# Patient Record
Sex: FEMALE | Race: WHITE | Employment: OTHER
[De-identification: names, ages, dates, MRNs, and addresses within clinical notes are randomized per-mention and may not be internally consistent; named-entity substitution may affect disease eponyms.]

---

## 2017-11-07 ENCOUNTER — ESTABLISHED COMPREHENSIVE EXAM (OUTPATIENT)
Age: 57
End: 2017-11-07

## 2017-11-07 DIAGNOSIS — H52.11: ICD-10-CM

## 2017-11-07 DIAGNOSIS — H52.201: ICD-10-CM

## 2017-11-07 PROCEDURE — 92014 COMPRE OPH EXAM EST PT 1/>: CPT

## 2017-11-07 PROCEDURE — 92015 DETERMINE REFRACTIVE STATE: CPT

## 2017-11-07 ASSESSMENT — VISUAL ACUITY
OS_CC: 20/25
OD_SC: 20/100-1
OD_CC: J6
OS_SC: J12
OD_SC: J8
OS_CC: J8
OS_SC: 20/50
OD_CC: 20/25-2

## 2017-11-07 ASSESSMENT — TONOMETRY
OD_IOP_MMHG: 15
OS_IOP_MMHG: 14

## 2017-11-30 NOTE — PATIENT DISCUSSION
(H25.13) Age-related nuclear cataract, bilateral - Assesment : Examination revealed cataract. DISCUSSED WITH PATIENT SHE MAY UPDATE MRX VS. CONSIDER CATARACT SURGERY. PATIENT WISHES TO HOLD OFF ON SURGERY, NOT TERRIBLY BOTHERED BY VISION AT THIS POINT. - Plan : Monitor for changes. Advised patient to call our office with decreased vision or increased symptoms.  MRX DISPENSED TODAY  1 YEAR EXAM / SOONER IF WISHES TO PROCEED WITH CAT SX

## 2018-11-14 NOTE — PATIENT DISCUSSION
(H35.363) Drusen (degenerative) of macula, bilateral - Assesment : Examination revealed Drusen at the arcades. - Plan : Monitor for changes. Advised patient to call our office with decreased vision or increased symptoms.

## 2018-11-14 NOTE — PATIENT DISCUSSION
(H33.322) Round hole, left eye - Assesment : Examination revealed OLD HOLE INFERIOR WITH DEMARCATION. NO NEW HOLES, TEARS OR DETACHMENTS SEEN TODAY - Plan : OBSERVATION. CALL WITH INCREASED FLASHES, FLOATER OR DECREASED VISION.